# Patient Record
Sex: MALE | Race: WHITE | Employment: PART TIME | URBAN - METROPOLITAN AREA
[De-identification: names, ages, dates, MRNs, and addresses within clinical notes are randomized per-mention and may not be internally consistent; named-entity substitution may affect disease eponyms.]

---

## 2017-09-22 ENCOUNTER — OFFICE VISIT (OUTPATIENT)
Dept: ORTHOPEDIC SURGERY | Age: 67
End: 2017-09-22

## 2017-09-22 VITALS
HEART RATE: 72 BPM | WEIGHT: 205 LBS | SYSTOLIC BLOOD PRESSURE: 139 MMHG | DIASTOLIC BLOOD PRESSURE: 89 MMHG | BODY MASS INDEX: 26.31 KG/M2 | HEIGHT: 74 IN

## 2017-09-22 DIAGNOSIS — M16.12 PRIMARY OSTEOARTHRITIS OF LEFT HIP: ICD-10-CM

## 2017-09-22 DIAGNOSIS — M76.02 GLUTEAL TENDINITIS OF LEFT BUTTOCK: ICD-10-CM

## 2017-09-22 DIAGNOSIS — M25.552 LEFT HIP PAIN: Primary | ICD-10-CM

## 2017-09-22 PROCEDURE — 20611 DRAIN/INJ JOINT/BURSA W/US: CPT | Performed by: ORTHOPAEDIC SURGERY

## 2017-09-22 PROCEDURE — 73502 X-RAY EXAM HIP UNI 2-3 VIEWS: CPT | Performed by: ORTHOPAEDIC SURGERY

## 2017-09-22 PROCEDURE — 99203 OFFICE O/P NEW LOW 30 MIN: CPT | Performed by: ORTHOPAEDIC SURGERY

## 2017-10-17 ENCOUNTER — OFFICE VISIT (OUTPATIENT)
Dept: ORTHOPEDIC SURGERY | Age: 67
End: 2017-10-17

## 2017-10-17 VITALS
HEART RATE: 66 BPM | BODY MASS INDEX: 26.31 KG/M2 | HEIGHT: 74 IN | DIASTOLIC BLOOD PRESSURE: 80 MMHG | SYSTOLIC BLOOD PRESSURE: 130 MMHG | WEIGHT: 205 LBS

## 2017-10-17 DIAGNOSIS — M16.12 PRIMARY OSTEOARTHRITIS OF LEFT HIP: Primary | ICD-10-CM

## 2017-10-17 PROCEDURE — 99213 OFFICE O/P EST LOW 20 MIN: CPT | Performed by: ORTHOPAEDIC SURGERY

## 2017-10-17 NOTE — PROGRESS NOTES
positives if not already being addressed during today's visit. Objective:   Physical Exam  Vital Signs:  /80   Pulse 66   Ht 6' 2\" (1.88 m)   Wt 205 lb (93 kg)   BMI 26.32 kg/m²     Constitution:  Generally, Earline Espinoza is [x] alert, [x] appears stated age, and [x] in no distress.   His general body habitus is [] Cachectic  [] Thin  [x] Normal  [] Obese  [] Morbidly Obese    Head: [x] Normocephalic  Eyes: [x] Extra-occular muscles intact  Left Ear: [x] External Ear normal   Right Ear: [x] External Ear normal   Nose: [x] Normal  Mouth: [x] Oral mucosa moist  [x] No perioral lesions    Pulmonary: [x] Respirations unlabored and regular  Skin: [x] Warm [x] Well perfused     Psychiatric:   [x] Good judgement and insight  [x] Oriented to [x] person, [x] place, and [x] time  [x] Mood appropriate for circumstances    Gait:   Gait is [x] Normal  [] Impaired   Assistive Device: [x] None  [] Knee Brace  [] Cane  [] Crutches   [] Mayur Lamprey   [] Wheelchair  [] Other     ORTHOPAEDIC LS SPINE EXAM   Inspection:  [x] Skin intact without abrasion, lacerations, surgical scars, pigment changes, dimpling, hairy patches or rashes  [x] Normal back alignment  [] Scoliosis [] Kyphosis  [] Dimpling  [] Hyper/hypopigmentation  [] Hairy Patches  [] Scar / Surgical incision    Palpation:   Nontender    Provocative Tests:  Negative Straight leg raise test    LEFT HIP ORTHOPAEDIC  EXAM:   Inspection:  [x] Skin intact without abrasion, lacerations or rashes  [x] Leg lengths equal  [] Ecchymosis:  [x] none  [] mild  [] moderate  [] severe   [] Atrophy:  [x] none  [] mild  [] moderate  [] severe      Range of Motion:  [x] No flexion contracture         [] Deferred: acute injury/post-surgery/pain  [] Flexion contracture    Forward flexion: 90  Supine Internal rotation: 10  Supine External rotation: 40  Abduction: 40  Adduction: 30      Palpation:   Nontender, mild soreness over trochanter    Provocative Tests:  [x] Negative  Positive Tests:  [] Log Roll   [] Liz Test: ITB Tightness   [] FADIR Anterior impingement:    [] Posterior Impingement    [] Shuck test for insufficient suction seal   [] Dial test for capsular insufficiency:    [] Resisted adduction for athletic pubalgia   [] Resisted curl up for athletic pubalgia     Motor Function:  [x] No gross motor weakness of hip [x] No gross motor weakness of knee  [x] No gross motor weakness of ankle    [x] No gross motor weakness of great toe    [] Motor strength:   [x] Hip Flex [x] 5/5 [] 4/5 [] 3/5 [] 2/5 [] 1/5 [] 0/5   [x] Hip ABductors [x] 5/5 [] 4/5 [] 3/5 [] 2/5 [] 1/5 [] 0/5   [x] Hip ADductors [x] 5/5 [] 4/5 [] 3/5 [] 2/5 [] 1/5 [] 0/5     Neurologic:   [x] Sensation to light touch intact  [x] Coordination / proprioception intact  Motor function intact L2-S1    Circulation:  [x] The limb is warm and well perfused. [x] Capillary refill is intact. [] Edema:  [x] none  [] mild  [] moderate  [] severe        Data Reviewed:     No new imaging    Assessment:     Alfredo Rodriguez is a 74y.o. year old male with left hip osteoarthritis. He is status post a left hip intra-articular injection 3 weeks ago is doing extremely well. He has no pain. He is extremely pleased with his progress. Diagnosis:   1. Primary osteoarthritis of left hip           Plan:      I discussed the diagnosis and the treatment options with Alfredo Rodriguez today. At this stage, we'll continue with the current course. I did provide him with some home exercises to do. If he has any persistent pain, we can repeat the intra-articular injection in several months. Otherwise, I wish him the best of luck. He is extremely satisfied     Return to Clinic/Follow - Up:  3 months p.r.n. Alfredo Rodriguez was instructed to call the office if his symptoms worsen or if new symptoms appear prior to the next scheduled visit.  He is specifically instructed to contact the office between now & his scheduled appointment if he has concerns related to his condition or if he needs assistance in scheduling the above tests. He is welcome to call for an appointment sooner if he has any additional concerns or questions. Patient Education Materials Provided:  [x] Dr Erik Gatica: New patient folder,  Anatomic Drawings and treatment algorithms    There are no Patient Instructions on file for this visit. No orders of the defined types were placed in this encounter. Refills/New Prescriptions:  No orders of the defined types were placed in this encounter. Today's prescription medications will be e-scribed (when appropriate) to the Patient's Preferred Pharmacy:   [address] [phone]  [facility]  Phone: [phone] Fax: [phone]         Marycruz Arango MD  Orthopaedic Surgeon, Sports Medicine  Director, Hip Arthroscopy and [facility] [address] [place] [address]    Contact Information:  ([place] [address] [phone])  [facility] main number: use after hours [phone])    This dictation was performed with a verbal recognition program (DRAGON) and it was checked for errors. It is possible that there are still dictated errors within this office note. If so, please bring any errors to my attention for an addendum. All efforts were made to ensure that this office note is accurate.

## 2018-09-10 ENCOUNTER — OFFICE VISIT (OUTPATIENT)
Dept: ORTHOPEDIC SURGERY | Age: 68
End: 2018-09-10

## 2018-09-10 VITALS
HEART RATE: 51 BPM | SYSTOLIC BLOOD PRESSURE: 112 MMHG | HEIGHT: 74 IN | WEIGHT: 205 LBS | BODY MASS INDEX: 26.31 KG/M2 | DIASTOLIC BLOOD PRESSURE: 76 MMHG

## 2018-09-10 DIAGNOSIS — M16.12 PRIMARY OSTEOARTHRITIS OF LEFT HIP: Primary | ICD-10-CM

## 2018-09-10 PROCEDURE — 20611 DRAIN/INJ JOINT/BURSA W/US: CPT | Performed by: ORTHOPAEDIC SURGERY

## 2018-09-10 PROCEDURE — 99213 OFFICE O/P EST LOW 20 MIN: CPT | Performed by: ORTHOPAEDIC SURGERY

## 2018-09-10 RX ORDER — PANTOPRAZOLE SODIUM 20 MG/1
20 TABLET, DELAYED RELEASE ORAL 2 TIMES DAILY
COMMUNITY

## 2018-09-10 RX ORDER — FLUOXETINE 10 MG/1
10 TABLET, FILM COATED ORAL DAILY
COMMUNITY
End: 2019-06-24

## 2018-09-10 NOTE — PROGRESS NOTES
9/10/18 10:58 AM         NDC: 5394-6623-59    Lot Number: [id number]    Comments: lt hip        9/10/18 10:58 AM         NDC: 10864-825-07    Lot Number: [id number]    Comments: lt soriano
Hyper/hypopigmentation  [] Hairy Patches  [] Scar / Surgical incision    Palpation:   Nontender    Provocative Tests:  Negative Straight leg raise test    Left HIP ORTHOPAEDIC  EXAM:   Inspection:  [x] Skin intact without abrasion, lacerations or rashes  [x] Leg lengths equal  [] Ecchymosis:  [x] none  [] mild  [] moderate  [] severe   [] Atrophy:  [x] none  [] mild  [] moderate  [] severe      Range of Motion:  [x] No flexion contracture         [] Deferred: acute injury/post-surgery/pain  [] Flexion contracture    Forward flexion: 90  Supine Internal rotation: 10 positive pain  Supine External rotation: 45      Palpation:   no Tenderness over greater trochanter    Provocative Tests:  [] Negative  Positive Tests:  [] Log Roll   [] Liz Test: ITB Tightness    [] FADIR Anterior impingement: Positive   [] Posterior Impingement    [] Shuck test for insufficient suction seal   [] Dial test for capsular insufficiency:    [] Resisted adduction for athletic pubalgia   [] Resisted curl up for athletic pubalgia     Motor Function:  [x] No gross motor weakness of hip [x] No gross motor weakness of knee  [x] No gross motor weakness of ankle    [x] No gross motor weakness of great toe      Neurologic:   [x] Sensation to light touch intact  [x] Coordination / proprioception intact  Motor function intact L2-S1    Circulation:  [x] The limb is warm and well perfused. [x] Capillary refill is intact. [] Edema:  [x] none  [] mild  [] moderate  [] severe           Data Reviewed:     XRays:  (2 views: Standing AP, 45 degree Dangelo, Frog leg lateral and False Profile) of his left hip and pelvis taken today 9/10/18 in the office and reviewed by me personally showed: Loss of joint space. Findings consistent with osteoarthritis include asymmetric joint space narrowing, subchondral sclerosis, osteophytes.     Other Imaging:  none    Assessment:     Argentina Baumgarten is a 79y.o. year old male who presents with left sided primary hip

## 2019-06-24 ENCOUNTER — OFFICE VISIT (OUTPATIENT)
Dept: ORTHOPEDIC SURGERY | Age: 69
End: 2019-06-24
Payer: COMMERCIAL

## 2019-06-24 VITALS — WEIGHT: 205.03 LBS | HEIGHT: 74 IN | BODY MASS INDEX: 26.31 KG/M2

## 2019-06-24 DIAGNOSIS — M16.12 PRIMARY OSTEOARTHRITIS OF LEFT HIP: Primary | ICD-10-CM

## 2019-06-24 DIAGNOSIS — M25.552 HIP PAIN, LEFT: ICD-10-CM

## 2019-06-24 PROCEDURE — 99213 OFFICE O/P EST LOW 20 MIN: CPT | Performed by: ORTHOPAEDIC SURGERY

## 2019-06-24 RX ORDER — CLOPIDOGREL BISULFATE 75 MG/1
75 TABLET ORAL
COMMUNITY
Start: 2019-02-05

## 2019-06-24 RX ORDER — ATORVASTATIN CALCIUM 80 MG/1
80 TABLET, FILM COATED ORAL
COMMUNITY
Start: 2019-02-05

## 2019-06-24 NOTE — PROGRESS NOTES
Chief Complaint    Hip Pain (LEFT posterior/lateral hip pain increased more often in past few months with donning socks and shoes and ambulation; noted decreased hip extension with gait)      History of Present Illness:  Jean Schirmer is a 81 y.o. male who comes in today for evaluation and treatment of left hip pain and stiffness. The patient is a previous patient of Dr. Nery Oliveros. He has reported about a 67-year history of progressively worsening hip pain and stiffness. He began seeing Dr. Nery Oliveros about 2 years ago and over the course the last 6 years has had 2-3 intra-articular cortisone injections. These typically provide good relief. Most of the pain he experiences is usually on the lateral aspect of the hip. He has noticed difficulty getting shoes and socks on getting in and out of a vehicle. This is his chief complaint. He is seeing us today mainly to become established now that Dr. Nery Oliveros has left practice. Pain Assessment  Location of Pain: Pelvis(hip)  Location Modifiers: Left, Posterior, Lateral  Severity of Pain: 5(at worst)  Quality of Pain: Aching, Dull  Duration of Pain: Persistent  Frequency of Pain: Intermittent  Aggravating Factors: Walking, Stretching, Bending, Straightening  Limiting Behavior: Some  Relieving Factors: Rest  Result of Injury: No]         Medical History:  History reviewed. No pertinent past medical history. There are no active problems to display for this patient. Current Outpatient Medications   Medication Sig Dispense Refill    atorvastatin (LIPITOR) 80 MG tablet Take 80 mg by mouth      clopidogrel (PLAVIX) 75 MG tablet Take 75 mg by mouth      pantoprazole (PROTONIX) 20 MG tablet Take 20 mg by mouth 2 times daily       No current facility-administered medications for this visit. Review of Systems:  Relevant review of systems reviewed and available in the patient's chart    Vital Signs: There were no vitals filed for this visit.     General Exam: Constitutional: Patient is adequately groomed with no evidence of malnutrition  DTRs: Deep tendon reflexes are intact  Mental Status: The patient is oriented to time, place and person. The patient's mood and affect are appropriate. Lymphatic: The lymphatic examination bilaterally reveals all areas to be without enlargement or induration. Vascular: Examination reveals no swelling or calf tenderness. Peripheral pulses are palpable and 2+. Neurological: The patient has good coordination. There is no weakness or sensory deficit. Body mass index is 26.31 kg/m². Left Hip Examination:    Inspection:  No erythema or signs of infection. There are no cutaneous lesions. Palpation:  moderate tenderness to palpation over the greater trochanteric region. Range of Motion: Mildly painful but limited range of motion of the hip particularly with flexion and external rotation causing reproducible groin pain. Strength:  4/5 strength in flexion and abduction limited by pain. Special Tests: There is a positive log roll maneuver. Positive straight leg raise against resistance. Negative Liz's test.  Negative Homans test.    Skin: There are no rashes, ulcerations or lesions. Gait: Antalgic favoring the left side    Reflex 2+ patellar    Additional Comments:       Additional Examinations:         Contralateral Exam: Examination of the right hip reveals intact skin. The patient demonstrates full painless range of motion with regards to flexion, abduction, internal and external rotation. There is no tenderness about the greater trochanter. There is a negative straight leg raise against resistance. Strength is 5/5 throughout all planes. Lower Back: Examination of the lower back does not show any tenderness, deformity or injury. Range of motion is unremarkable. There is no gross instability. There are no rashes, ulcerations or lesions.   Strength and tone are normal.    Radiology:     X-rays obtained and reviewed in office:  Views 2 views including AP pelvis and lateral  Location left hip  Impression no fractures or malalignment identified. There is end-stage osteoarthritis of the femoral acetabular joint space narrowing with subchondral cysts, sclerosis and osteophyte formation. There is advanced osteoarthritis developing on the right hip as well. Impression:  Encounter Diagnoses   Name Primary?  Hip pain, left     Primary osteoarthritis of left hip Yes       Office Procedures:  Orders Placed This Encounter   Procedures    XR HIP LEFT (2-3 VIEWS)     Standing Status:   Future     Number of Occurrences:   1     Standing Expiration Date:   6/21/2020       Treatment Plan:  I discussed with the patient the nature of osteoarthritis of the hip. We talked about treatment of arthritis and the various options that are involved with this. The patient understands that the treatments can vary from essentially doing nothing to a total joint replacement arthroplasty for arthritis. I then went on to describe the utilization of glucosamine and chondroitin sulfate as a joint nutrition product. We talked about the fact that this is essentially a joint vitamin with typically minimal side effects. We also talked about utilization of prescription over-the-counter anti-inflammatory medications as the next option. We also talked about the corticosteroid injections and the fact that this can give a brief window of relief, but does not cure the problem; in fact, the pain often has a rebound effect in 6-10 weeks after the steroid has worn off. Lastly we discussed total joint replacement arthroplasty as the final and definitive step in treatment of arthritis. Patient realizes the magnitude of this type of treatment as well as having voiced a general understanding to the duration of the prosthesis. The patient voiced understanding to these continuum of treatment options.     At this time here he is merely getting established with our office. We have advised against getting more repeated injections if this can be avoided. He understands that ultimately he would be a candidate for a left anterior total hip replacement with robotic assistance. He will follow-up with us on an as-needed basis.

## 2019-10-03 ENCOUNTER — OFFICE VISIT (OUTPATIENT)
Dept: ORTHOPEDIC SURGERY | Age: 69
End: 2019-10-03
Payer: COMMERCIAL

## 2019-10-03 VITALS — WEIGHT: 205.03 LBS | HEIGHT: 74 IN | BODY MASS INDEX: 26.31 KG/M2

## 2019-10-03 DIAGNOSIS — M16.12 PRIMARY OSTEOARTHRITIS OF LEFT HIP: Primary | ICD-10-CM

## 2019-10-03 PROCEDURE — 99213 OFFICE O/P EST LOW 20 MIN: CPT | Performed by: PHYSICIAN ASSISTANT

## 2023-10-03 ENCOUNTER — HOSPITAL ENCOUNTER (OUTPATIENT)
Dept: PHYSICAL THERAPY | Age: 73
Setting detail: THERAPIES SERIES
Discharge: HOME | End: 2023-10-03
Payer: MEDICARE

## 2023-10-03 DIAGNOSIS — M25.60 STIFFNESS IN JOINT: ICD-10-CM

## 2023-10-03 DIAGNOSIS — M25.552 LEFT HIP PAIN: Primary | ICD-10-CM

## 2023-10-03 DIAGNOSIS — R26.2 DIFFICULTY WALKING: ICD-10-CM

## 2023-10-03 DIAGNOSIS — R53.1 WEAKNESS GENERALIZED: ICD-10-CM

## 2023-10-03 PROCEDURE — 97161 PT EVAL LOW COMPLEX 20 MIN: CPT | Performed by: PHYSICAL THERAPIST

## 2023-10-03 PROCEDURE — 97110 THERAPEUTIC EXERCISES: CPT | Performed by: PHYSICAL THERAPIST

## 2023-10-03 NOTE — PLAN OF CARE
Rheumatoid arthritis (M05.9)  [] Osteoarthritis (M19.91)  [] Gout        Neurological conditions  [] Prior Stroke (I69.30)  [] Parkinson's (G20)  [] Encephalopathy (G93.40)  [] Multiple Sclerosis (G35)  [] Post-polio (G14)  [] Spinal cord injury (SCI)  [] Traumatic brain injury (TBI)  [] ALS    Gastrointestinal conditions   [] Constipation (K59.00)  [] Chron's/ulcerative colitis    Endocrine conditions   [] Hypothyroid (E03.9)  [] Hyperthyroid [] Hx of COVID    Musculoskeletal conditions  [] Disc pathology   [] Congenital spine pathologies   [] Osteoporosis (M81.8)  [] Osteopenia (M85.8)  [] Scoliosis    Cardio/Pulmonary conditions  [] Asthma (J45)  [] Coughing   [] COPD (J44.9)  [] CHF  [] A-fib          Rheumatological conditions  [] Fibromyalgia (M79.7)  [] Lupus  [] Sjogrens  [] Ankylosing spondylitis  [] Other autoimmune       Metabolic conditions  [] Morbid obesity (E66.01)  [] Diabetes type 1(E10.65) or 2 (E11.65)   [] Neuropathy (G60.9)        Cardiovascular conditions  [] Hypertension (I10)  [] Hyperlipidemia (E78.5)  [] Angina pectoris (I20)  [] Atherosclerosis (I70)  [] Pacemaker/Defib  [] Hx of CABG/stent/cardiac surgeries        Developmental Disorders  [] Autism (F84.0)  [] Cerebral Palsy (G80)  [] Down Syndrome (Q90.9)  [] Developmental delay     Psychological Disorders  [] Anxiety (F41.9)  [] Depression (F32.9)   [] Bipolar  [] Other:      Prior surgeries  [] Involved limb  [] Previous spinal surgery  []  section birth  [] Hysterectomy  [] Bowel / bladder surgery  [] Other relevant surgeries        Other conditions  [] Vertigo  [] Syncope  [] Kidney Failure  [] Cancer  [] Pregnancy  [] Incontinence   Other Co-morbidities not listed:     ASSESSMENT   Assessment:   Francine Quiles is a 72 y.o. male presenting today to Outpatient PT with signs and symptoms consistent with L MARLON.     Pt. presents with the functional impairments and activity limitations listed below and would benefit from

## 2023-10-03 NOTE — FLOWSHEET NOTE
(<30days) to assess goals progression   [] Goals require adjustment due to lack of progress  [] Patient is not progressing as expected and requires additional follow up with physician  [] Other:     CHARGE CAPTURE     CHARGE GRID   CPT Code (TIMED) minutes # CPT Code (UNTIMED) #     [] Therex (80929)  30 2  [x] EVAL:LOW (23624 - Typically 20 minutes face-to-face) 1    [] Neuromusc. Re-ed (17532)    [] Re-Eval (96916)     [] Manual ([id number])    [] Estim Unattended (81075)     [] Ther. Act (52801)    [] Dicky Marija. Traction (41016)     [] Gait (29837)    [] Dry Needle 1-2 muscle (77561)     [] Aquatic Therex (96702)    [] Dry Needle 3+ muscle (44967)     [] Iontophoresis (82573)    [] VASO (44831)     [] Ultrasound (95727)    [] Group Therapy (12420)     [] Estim Attended (50995)    [] Other: Total Timed Code Tx Minutes 30        Total Treatment Minutes 45        Charge Justification:  (64322) THERAPEUTIC EXERCISE - Provided verbal/tactile cueing for activities related to strengthening, flexibility, endurance, ROM performed to prevent loss of range of motion, maintain or improve muscular strength or increase flexibility, following either an injury or surgery. (94848) [facility]- Reviewed/Progressed HEP activities related to strengthening, flexibility, endurance, ROM performed to prevent loss of range of motion, maintain or improve muscular strength or increase flexibility, following either an injury or surgery. TREATMENT PLAN   Plan: POC Initiated today- see jennifer for details    Electronically Signed by Aakash Lovelace, PT              Date: 10/03/2023     Note: If patient does not return for scheduled/recommended follow up visits, this note will serve as a discharge from care along with the most recent update on progress.

## 2023-10-06 ENCOUNTER — HOSPITAL ENCOUNTER (OUTPATIENT)
Dept: PHYSICAL THERAPY | Age: 73
Setting detail: THERAPIES SERIES
Discharge: HOME | End: 2023-10-06
Payer: MEDICARE

## 2023-10-06 PROCEDURE — 97110 THERAPEUTIC EXERCISES: CPT | Performed by: PHYSICAL THERAPIST

## 2023-10-06 PROCEDURE — 97530 THERAPEUTIC ACTIVITIES: CPT | Performed by: PHYSICAL THERAPIST

## 2023-10-06 NOTE — FLOWSHEET NOTE
medical necessity for care and/or justification of therapy services: The patient has a musculoskeletal condition(s) with a corresponding ICD-10 code that is of complexity and severity that require skilled therapeutic intervention. This has a direct and significant impact on the need for therapy and significantly impacts the rate of recovery. Treatment/Activity Tolerance:  [x] Patient tolerated treatment well [] Patient limited by fatique  [] Patient limited by pain  [] Patient limited by other medical complications  [] Other:     Return to Play: NA    Prognosis for POC: [x] Good [] Fair  [] Poor    Patient requires continued skilled intervention: [x] Yes  [] No      GOALS     Patient stated goal: Improved mobility, get back to recreational activities   Status: [] Progressing: [] Met: [] Not Met: [] Adjusted     Therapist goals for Patient:   Short Term Goals: To be achieved in: 4 weeks 10/31/23  Independent in HEP and progression per patient tolerance, in order to progress toward full function and prevent re-injury. Status: [] Progressing: [] Met: [] Not Met: [] Adjusted  Patient will have a decrease in pain to 0/10 to help  facilitate improvement in movement, function, and ADLs as indicated by functional deficits. Status: [] Progressing: [] Met: [] Not Met: [] Adjusted     Long Term Goals: To be achieved in: 8-10 weeks 11/28/23-12/12/23  Disability index score of 5% or less for the  HOS  to assist with return top prior level of function. Status: [] Progressing: [] Met: [] Not Met: [] Adjusted  Improve hip AROM Flexion, extension, ABD, ADD, ER, and IR to [facility] for MARLON to allow for proper joint functioning as indicated by patients functional deficits. Status: [] Progressing: [] Met: [] Not Met: [] Adjusted  Pt to improve strength to show motor control/activation of proximal hip and quadriceps to facilitate functional mobility and ADLs.    Status: [] Progressing: [] Met:

## 2023-10-09 ENCOUNTER — HOSPITAL ENCOUNTER (OUTPATIENT)
Dept: PHYSICAL THERAPY | Age: 73
Setting detail: THERAPIES SERIES
Discharge: HOME | End: 2023-10-09
Payer: MEDICARE

## 2023-10-09 PROCEDURE — 97530 THERAPEUTIC ACTIVITIES: CPT | Performed by: PHYSICAL THERAPIST

## 2023-10-09 PROCEDURE — 97110 THERAPEUTIC EXERCISES: CPT | Performed by: PHYSICAL THERAPIST

## 2023-10-09 NOTE — FLOWSHEET NOTE
[address] [place] [address] and Therapy [facility] MISSYRicardo Anika Eliecer, [address] [place] [address] office: [phone] fax: [phone]      Physical Therapy: TREATMENT/PROGRESS NOTE   Patient: Gamaliel Garza (25 y.o. male)   Treatment Date: 10/09/2023   :  1950 MRN: [id number]   Visit #: 3    Insurance: Payor: Krystal Palacio / Plan: [address] [place] [address] / Product Type: *No Product type* /   Insurance ID: [id number] - (Medicare Managed)  Secondary Insurance (if applicable):    Treatment Diagnosis:       ICD-10-CM     1. Left hip pain  M25.552         2. Stiffness in joint  M25.60         3. Weakness generalized  R53.1         4. Difficulty walking  R26.2       Medical Diagnosis:    Left hip pain [M25.552] S/P L MARLON DOS 23   Referring Physician: Danielle Durbin MD  PCP: No primary care provider on file. Plan of care signed (Y/N):     Date of Patient follow up with Physician:      Progress Report/POC: NO    POC update due: (10 visits or 30 days whichever is less OR AUTH LIMITs): 11/3/23    Visit # Insurance Allowable Auth Needed   3 [place] []Yes    []No     Latex Allergy:  [x]NO      []YES    Preferred Language for Healthcare:   [x]English       []other:    SUBJECTIVE EXAMINATION     Patient Report/Comments: 5 wks po. Pt states that his hip feels pretty good, he feels the most stiff in the morning. He is struggling with hip flexor rosa at home, doesn't feel like he can get the muscle activated well.     OBJECTIVE EXAMINATION     Observation: see eval    Test measurements: see eval     Test used Initial score 10/09/2023   Pain Summary VAS 1    Functional questionnaire HOS =14.4% deficit    ROM        See eval                Strength  See eval                        RESTRICTIONS/PRECAUTIONS: MARLON precautions    Exercises/Interventions:     Therapeutic Ex (57819)  Resistance Sets/time Reps Notes/Cues/Progressions   Knee to chest stretch -    Prone quad stretch with

## 2023-10-12 ENCOUNTER — HOSPITAL ENCOUNTER (OUTPATIENT)
Dept: PHYSICAL THERAPY | Age: 73
Setting detail: THERAPIES SERIES
Discharge: HOME | End: 2023-10-12
Payer: MEDICARE

## 2023-10-12 PROCEDURE — 97110 THERAPEUTIC EXERCISES: CPT | Performed by: PHYSICAL THERAPIST

## 2023-10-12 PROCEDURE — 97530 THERAPEUTIC ACTIVITIES: CPT | Performed by: PHYSICAL THERAPIST

## 2023-10-12 PROCEDURE — 97140 MANUAL THERAPY 1/> REGIONS: CPT | Performed by: PHYSICAL THERAPIST

## 2023-10-12 NOTE — FLOWSHEET NOTE
hip flexion exercises but did exhibit ability to raise leg higher into flexion during standing SLRs, did require cues to keep knee straight to avoid knee kick for momentum. Patient had good tolerance to today's session, reporting muscular fatigue with program completed. Continues to display deficits in tightness, stiffness, and weakness which required ongoing skilled physical therapy and decision making. Medical Necessity Documentation:  I certify that this patient meets the below criteria necessary for medical necessity for care and/or justification of therapy services: The patient has a musculoskeletal condition(s) with a corresponding ICD-10 code that is of complexity and severity that require skilled therapeutic intervention. This has a direct and significant impact on the need for therapy and significantly impacts the rate of recovery. Treatment/Activity Tolerance:  [x] Patient tolerated treatment well [] Patient limited by fatique  [] Patient limited by pain  [] Patient limited by other medical complications  [] Other:     Return to Play: NA    Prognosis for POC: [x] Good [] Fair  [] Poor    Patient requires continued skilled intervention: [x] Yes  [] No      GOALS     Patient stated goal: Improved mobility, get back to recreational activities   Status: [] Progressing: [] Met: [] Not Met: [] Adjusted     Therapist goals for Patient:   Short Term Goals: To be achieved in: 4 weeks 10/31/23  Independent in HEP and progression per patient tolerance, in order to progress toward full function and prevent re-injury. Status: [] Progressing: [] Met: [] Not Met: [] Adjusted  Patient will have a decrease in pain to 0/10 to help  facilitate improvement in movement, function, and ADLs as indicated by functional deficits. Status: [] Progressing: [] Met: [] Not Met: [] Adjusted     Long Term Goals:  To be achieved in: 8-10 weeks 11/28/23-12/12/23  Disability index score of 5% or less for

## 2023-10-17 ENCOUNTER — HOSPITAL ENCOUNTER (OUTPATIENT)
Dept: PHYSICAL THERAPY | Age: 73
Setting detail: THERAPIES SERIES
Discharge: HOME | End: 2023-10-17
Payer: MEDICARE

## 2023-10-17 PROCEDURE — 97110 THERAPEUTIC EXERCISES: CPT | Performed by: PHYSICAL THERAPIST

## 2023-10-17 PROCEDURE — 97140 MANUAL THERAPY 1/> REGIONS: CPT | Performed by: PHYSICAL THERAPIST

## 2023-10-17 NOTE — FLOWSHEET NOTE
[address] [place] [address] and Therapy [facility] MENG [place], [address] [place] [address] office: [phone] fax: [phone]      Physical Therapy: TREATMENT/PROGRESS NOTE   Patient: Alistair Mccoy (42 y.o. male)   Treatment Date: 10/17/2023   :  1950 MRN: [id number]   Visit #: 5    Insurance: Payor: Juan A iL / Plan: [address] [place] [address] / Product Type: *No Product type* /   Insurance ID: [id number] - (Medicare Managed)  Secondary Insurance (if applicable):    Treatment Diagnosis:       ICD-10-CM     1. Left hip pain  M25.552         2. Stiffness in joint  M25.60         3. Weakness generalized  R53.1         4. Difficulty walking  R26.2       Medical Diagnosis:    Left hip pain [M25.552] S/P L MARLON DOS 23   Referring Physician: Cuca Valle MD  PCP: No primary care provider on file. Plan of care signed (Y/N):     Date of Patient follow up with Physician: 1 year po     Progress Report/POC: NO    POC update due: (10 visits or 30 days whichever is less OR AUTH LIMITs): 11/3/23    Visit # Insurance Allowable Auth Needed   5 MN []Yes    [x]No     Latex Allergy:  [x]NO      []YES    Preferred Language for Healthcare:   [x]English       []other:    SUBJECTIVE EXAMINATION     Patient Report/Comments: 6 wks po. Pt saw Dr. Emmie Bell yesterday, appt went well and pleased with progress. Hip flexor is not as sore as last week. Had out of [employer] over the weekend so was not as diligent with HEP. Leaving tomorrow for [facility] for 2 weeks but does have PT set up there.     OBJECTIVE EXAMINATION     Observation: see eval    Test measurements: see eval     Test used Initial score 10/17/2023   Pain Summary VAS 1    Functional questionnaire HOS =14.4% deficit    ROM        See eval                Strength  See eval                        RESTRICTIONS/PRECAUTIONS: MARLON precautions    Exercises/Interventions:     Therapeutic Ex (97193)  Resistance Parameters

## 2023-11-02 ENCOUNTER — HOSPITAL ENCOUNTER (OUTPATIENT)
Dept: PHYSICAL THERAPY | Age: 73
Setting detail: THERAPIES SERIES
Discharge: HOME | End: 2023-11-02
Payer: MEDICARE

## 2023-11-02 PROCEDURE — 97110 THERAPEUTIC EXERCISES: CPT | Performed by: PHYSICAL THERAPIST

## 2023-11-02 PROCEDURE — 97140 MANUAL THERAPY 1/> REGIONS: CPT | Performed by: PHYSICAL THERAPIST

## 2023-11-02 NOTE — PLAN OF CARE
that this patient meets the below criteria necessary for medical necessity for care and/or justification of therapy services: The patient has a musculoskeletal condition(s) with a corresponding ICD-10 code that is of complexity and severity that require skilled therapeutic intervention. This has a direct and significant impact on the need for therapy and significantly impacts the rate of recovery. Treatment/Activity Tolerance:  [x] Patient tolerated treatment well [] Patient limited by fatique  [] Patient limited by pain  [] Patient limited by other medical complications  [] Other:     Return to Play: NA    Prognosis for POC: [x] Good [] Fair  [] Poor    Patient requires continued skilled intervention: [x] Yes  [] No      GOALS     Patient stated goal: Improved mobility, get back to recreational activities   Status: [] Progressing: [] Met: [] Not Met: [] Adjusted     Therapist goals for Patient:   Short Term Goals: To be achieved in: 4 weeks 10/31/23  Independent in HEP and progression per patient tolerance, in order to progress toward full function and prevent re-injury. Status: [] Progressing: [] Met: [] Not Met: [] Adjusted  Patient will have a decrease in pain to 0/10 to help  facilitate improvement in movement, function, and ADLs as indicated by functional deficits. Status: [] Progressing: [] Met: [] Not Met: [] Adjusted     Long Term Goals: To be achieved in: 8-10 weeks 11/28/23-12/12/23  Disability index score of 5% or less for the  HOS  to assist with return top prior level of function. Status: [] Progressing: [] Met: [] Not Met: [] Adjusted  Improve hip AROM Flexion, extension, ABD, ADD, ER, and IR to [facility] for MARLON to allow for proper joint functioning as indicated by patients functional deficits.   Status: [] Progressing: [] Met: [] Not Met: [] Adjusted  Pt to improve strength to show motor control/activation of proximal hip and quadriceps to facilitate

## 2023-11-07 ENCOUNTER — HOSPITAL ENCOUNTER (OUTPATIENT)
Dept: PHYSICAL THERAPY | Age: 73
Setting detail: THERAPIES SERIES
Discharge: HOME | End: 2023-11-07
Payer: MEDICARE

## 2023-11-07 PROCEDURE — 97110 THERAPEUTIC EXERCISES: CPT | Performed by: PHYSICAL THERAPIST

## 2023-11-07 NOTE — FLOWSHEET NOTE
[address] [place] [address] and Therapy [facility] MENG [address], [address] office: [phone] fax: [phone]      Physical Therapy: TREATMENT/PROGRESS NOTE   Patient: Dylan Romero (42 y.o. male)   Treatment Date: 2023   :  1950 MRN: [id number]   Visit #: 7    Insurance: Payor: Evelyn Antoine / Plan: [address] [place] [address] / Product Type: *No Product type* /   Insurance ID: [id number] - (Medicare Managed)  Secondary Insurance (if applicable):    Treatment Diagnosis:       ICD-10-CM     1. Left hip pain  M25.552         2. Stiffness in joint  M25.60         3. Weakness generalized  R53.1         4. Difficulty walking  R26.2       Medical Diagnosis:    Left hip pain [M25.552] S/P L MARLON DOS 23   Referring Physician: Mookie Oropeza MD  PCP: No primary care provider on file. Plan of care signed (Y/N):     Date of Patient follow up with Physician: 1 year po     Progress Report/POC: YES    POC update due: (10 visits or 30 days whichever is less OR AUTH LIMITs): 23    Visit # Insurance Allowable Auth Needed   7 MN []Yes    [x]No     Latex Allergy:  [x]NO      []YES    Preferred Language for Healthcare:   [x]English       []other:    SUBJECTIVE EXAMINATION     Patient Report/Comments: 8 wks po. Pt states that hip feels good except for first few steps after getting up from sitting, even when sitting for short durations. Pt notes that \"ball\" at front of hip is more noticeable.      OBJECTIVE EXAMINATION     Observation: see eval    Test measurements: see eval     Test used Initial score 2023   Pain Summary VAS 1 0/10   Functional questionnaire HOS =14.4% deficit =9.2% deficit   ROM   AROM     See eval 106 flex  20 ABD  15 ER  10 IR               Strength  See eval Grossly 4/5                       ROM/Strength:           AROM L AROM R Notes PROM L PROM R Notes                           HIP Flexion 106     110

## 2023-11-10 ENCOUNTER — HOSPITAL ENCOUNTER (OUTPATIENT)
Dept: PHYSICAL THERAPY | Age: 73
Setting detail: THERAPIES SERIES
Discharge: HOME | End: 2023-11-10
Payer: MEDICARE

## 2023-11-10 PROCEDURE — 97110 THERAPEUTIC EXERCISES: CPT | Performed by: PHYSICAL THERAPIST

## 2023-11-10 NOTE — FLOWSHEET NOTE
[address] [place] [address] and Therapy [address] [facility], [address] office: [phone] fax: [phone]      Physical Therapy: TREATMENT/PROGRESS NOTE   Patient: Macho Noriega (41 y.o. male)   Treatment Date: 11/10/2023   :  1950 MRN: [id number]   Visit #: 8    Insurance: Payor: [address] [place] [address] / Plan: [address] [place] [address] / Product Type: *No Product type* /   Insurance ID: [id number] - (Medicare Managed)  Secondary Insurance (if applicable):    Treatment Diagnosis:       ICD-10-CM     1. Left hip pain  M25.552         2. Stiffness in joint  M25.60         3. Weakness generalized  R53.1         4. Difficulty walking  R26.2       Medical Diagnosis:    Left hip pain [M25.552] S/P L MARLON DOS 23   Referring Physician: Yola Plasencia MD  PCP: No primary care provider on file. Plan of care signed (Y/N):     Date of Patient follow up with Physician: 1 year po     Progress Report/POC: NO    POC update due: (10 visits or 30 days whichever is less OR AUTH LIMITs): 23    Visit # Insurance Allowable Auth Needed   8 MN []Yes    [x]No     Latex Allergy:  [x]NO      []YES    Preferred Language for Healthcare:   [x]English       []other:    SUBJECTIVE EXAMINATION     Patient Report/Comments: 8 wks po. Pt states that hip feels okay- but \"ball\" at front of hip feels more noticeable and feels like it is moving around. Pt states he is most frustrated by pain/\"hitch\" in his gait when getting up from bed in the AM and getting up from sitting.     OBJECTIVE EXAMINATION     Observation: see eval    Test measurements: see eval     Test used Initial score 11/10/2023   Pain Summary VAS 1 0/10   Functional questionnaire HOS =14.4% deficit =9.2% deficit   ROM   AROM     See eval 106 flex  20 ABD  15 ER  10 IR               Strength  See eval Grossly 4/5                       ROM/Strength:           AROM L AROM R Notes PROM L PROM R Notes

## 2023-11-14 ENCOUNTER — HOSPITAL ENCOUNTER (OUTPATIENT)
Dept: PHYSICAL THERAPY | Age: 73
Setting detail: THERAPIES SERIES
Discharge: HOME | End: 2023-11-14
Payer: MEDICARE

## 2023-11-14 PROCEDURE — 97110 THERAPEUTIC EXERCISES: CPT | Performed by: PHYSICAL THERAPIST

## 2023-11-14 NOTE — FLOWSHEET NOTE
[address] [place] [address] and Therapy [address] [facility] [address] [place] [address] office: [phone] fax: [phone]      Physical Therapy: TREATMENT/PROGRESS NOTE   Patient: Martha Dumont (45 y.o. male)   Treatment Date: 2023   :  1950 MRN: [id number]   Visit #: 9    Insurance: Payor: [address] [place] [address] / Plan: [address] [place] [address] / Product Type: *No Product type* /   Insurance ID: [id number] - (Medicare Managed)  Secondary Insurance (if applicable):    Treatment Diagnosis:       ICD-10-CM     1. Left hip pain  M25.552         2. Stiffness in joint  M25.60         3. Weakness generalized  R53.1         4. Difficulty walking  R26.2       Medical Diagnosis:    Left hip pain [M25.552] S/P L MARLON DOS 23   Referring Physician: Gisel Leonardo MD  PCP: No primary care provider on file. Plan of care signed (Y/N):     Date of Patient follow up with Physician: 1 year po     Progress Report/POC: NO    POC update due: (10 visits or 30 days whichever is less OR AUTH LIMITs): 23    Visit # Insurance Allowable Auth Needed   9 MN []Yes    [x]No     Latex Allergy:  [x]NO      []YES    Preferred Language for Healthcare:   [x]English       []other:    SUBJECTIVE EXAMINATION     Patient Report/Comments: 8 wks po. Pt states that he is feeling better with transitions. Program adjustments from [facility] have been helpful.     OBJECTIVE EXAMINATION     Observation: see eval    Test measurements: see eval     Test used Initial score 2023   Pain Summary VAS 1 0/10   Functional questionnaire HOS =14.4% deficit =9.2% deficit   ROM   AROM     See eval 106 flex  20 ABD  15 ER  10 IR               Strength  See eval Grossly 4/5                       ROM/Strength:           AROM L AROM R Notes PROM L PROM R Notes                           HIP Flexion 106     110        Abduction  20     20        ER  15 supine 90/90     25 supine 90/90        IR 10 supine all other ROS negative except as per HPI

## 2023-11-17 ENCOUNTER — HOSPITAL ENCOUNTER (OUTPATIENT)
Dept: PHYSICAL THERAPY | Age: 73
Setting detail: THERAPIES SERIES
Discharge: HOME | End: 2023-11-17
Payer: MEDICARE

## 2023-11-17 PROCEDURE — 97110 THERAPEUTIC EXERCISES: CPT | Performed by: PHYSICAL THERAPIST

## 2023-11-17 NOTE — FLOWSHEET NOTE
[address] [place] [address] and Therapy [address] [facility]., [address] [place] [address] office: [phone] fax: [phone]      Physical Therapy: TREATMENT/PROGRESS NOTE   Patient: Marcial Lopez (26 y.o. male)   Treatment Date: 2023   :  1950 MRN: [id number]   Visit #: 10    Insurance: Payor: Rosa Elena Breath / Plan: [address] [place] [address] / Product Type: *No Product type* /   Insurance ID: [id number] - (Medicare Managed)  Secondary Insurance (if applicable):    Treatment Diagnosis:       ICD-10-CM     1. Left hip pain  M25.552         2. Stiffness in joint  M25.60         3. Weakness generalized  R53.1         4. Difficulty walking  R26.2       Medical Diagnosis:    Left hip pain [M25.552] S/P L MARLON DOS 23   Referring Physician: Grupo Oropeza MD  PCP: No primary care provider on file. Plan of care signed (Y/N):     Date of Patient follow up with Physician: 1 year po     Progress Report/POC: NO    POC update due: (10 visits or 30 days whichever is less OR AUTH LIMITs): 23    Visit # Insurance Allowable Auth Needed   10 MN []Yes    [x]No     Latex Allergy:  [x]NO      []YES    Preferred Language for Healthcare:   [x]English       []other:    SUBJECTIVE EXAMINATION     Patient Report/Comments: 8 wks po. Pt states that hip continues to feel better, transitions are getting easier. Has worked out with his , mostly upper body focus but going well.     OBJECTIVE EXAMINATION     Observation: see eval    Test measurements: see eval     Test used Initial score 2023   Pain Summary VAS 1 0/10   Functional questionnaire HOS =14.4% deficit =9.2% deficit   ROM   AROM     See eval 106 flex  20 ABD  15 ER  10 IR               Strength  See eval Grossly 4/5                       ROM/Strength:           AROM L AROM R Notes PROM L PROM R Notes                           HIP Flexion 106     110        Abduction  20     20        ER  15

## 2023-11-21 ENCOUNTER — HOSPITAL ENCOUNTER (OUTPATIENT)
Dept: PHYSICAL THERAPY | Age: 73
Setting detail: THERAPIES SERIES
Discharge: HOME | End: 2023-11-21
Payer: MEDICARE

## 2023-11-21 PROCEDURE — 97110 THERAPEUTIC EXERCISES: CPT | Performed by: PHYSICAL THERAPIST

## 2023-11-21 NOTE — FLOWSHEET NOTE
tissue extensibility and allowing for proper ROM for normal function with self care, mobility, lifting and ambulation    TREATMENT PLAN   Plan: Cont POC- Continue emphasis/focus on exercise progression, improving proper muscle recruitment and activation/motor control patterns, and increasing ROM. Next visit plan to continue current phase     Electronically Signed by Gaby James PT              Date: 11/21/2023     Note: If patient does not return for scheduled/recommended follow up visits, this note will serve as a discharge from care along with the most recent update on progress.

## 2023-11-28 ENCOUNTER — APPOINTMENT (OUTPATIENT)
Dept: PHYSICAL THERAPY | Age: 73
End: 2023-11-28
Payer: MEDICARE

## 2023-12-01 ENCOUNTER — APPOINTMENT (OUTPATIENT)
Dept: PHYSICAL THERAPY | Age: 73
End: 2023-12-01
Payer: MEDICARE

## 2023-12-14 ENCOUNTER — HOSPITAL ENCOUNTER (OUTPATIENT)
Dept: PHYSICAL THERAPY | Age: 73
Setting detail: THERAPIES SERIES
Discharge: HOME | End: 2023-12-14
Payer: MEDICARE

## 2023-12-14 PROCEDURE — 97110 THERAPEUTIC EXERCISES: CPT | Performed by: PHYSICAL THERAPIST

## 2023-12-14 NOTE — DISCHARGE SUMMARY
[address] [place] [address] and Therapy [facility] MISSYRicardo Bijal [address] office: [phone] fax: [phone]      Physical Therapy: TREATMENT/PROGRESS NOTE   Patient: Aislinn Acosta (86 y.o. male)   Treatment Date: 2023   :  1950 MRN: [id number]   Visit #: 12    Insurance: Payor: Glo Gr / Plan: [address] [place] [address] / Product Type: *No Product type* /   Insurance ID: [id number] - (Medicare Managed)  Secondary Insurance (if applicable):    Treatment Diagnosis:       ICD-10-CM     1. Left hip pain  M25.552         2. Stiffness in joint  M25.60         3. Weakness generalized  R53.1         4. Difficulty walking  R26.2       Medical Diagnosis:    Left hip pain [M25.552] S/P L MARLON DOS 23   Referring Physician: Arvind Moraes MD  PCP: No primary care provider on file. Plan of care signed (Y/N):     Date of Patient follow up with Physician: 1 year po     Progress Report/POC: NO    POC update due: (10 visits or 30 days whichever is less OR AUTH LIMITs): 23     Visit # Insurance Allowable Auth Needed   12 MN []Yes    [x]No     Latex Allergy:  [x]NO      []YES    Preferred Language for Healthcare:   [x]English       []other:    SUBJECTIVE EXAMINATION     Patient Report/Comments: 12 wks po. Pt last seen . Had COVID and then went to [facility] for a week. Went back to  yesterday. Pt states that he feels very stable with his hip and feels that he can do everything. Pt still having some irritation through anterior hip, does burn occasionally. Pt feels comfortable making today LPV. Will be going to [facility] for 4 months on .     OBJECTIVE EXAMINATION     Observation: see eval    Test measurements: see eval     Test used Initial score 23   Pain Summary VAS 1 0/10 0/10   Functional questionnaire HOS =14.4% deficit =9.2% deficit WOMAC 096=0% deficit   ROM   AROM      See eval 106 flex  20 ABD  15